# Patient Record
(demographics unavailable — no encounter records)

---

## 2024-12-20 NOTE — PHYSICAL EXAM
[Laceration] : laceration [NL] : warm, clear [FreeTextEntry2] : healing well 2.0 3 staples no pus no major swelling

## 2024-12-20 NOTE — HISTORY OF PRESENT ILLNESS
[de-identified] : follow up for staple removal hurt one week ago [FreeTextEntry6] : after hitting his head on the table as he was lifting his head otherwise doing better no distress no LOC